# Patient Record
Sex: FEMALE
[De-identification: names, ages, dates, MRNs, and addresses within clinical notes are randomized per-mention and may not be internally consistent; named-entity substitution may affect disease eponyms.]

---

## 2023-09-18 ENCOUNTER — NURSE TRIAGE (OUTPATIENT)
Dept: OTHER | Facility: CLINIC | Age: 23
End: 2023-09-18

## 2023-09-18 NOTE — TELEPHONE ENCOUNTER
Location of patient: {[place]    Received call from [address] at [facility] with Red Flag Complaint. Emilie Vasquez MRN: [id number]     Provider: Merline Pratt NP    Subjective: Caller states \"When every I go to pee, my right side hurts. After I pee the pain goes away. When I push it feels more achy. \" Also feels achy with sex. Started the beginning of September. Current Symptoms:   Intermittent right lower abdomen pain  Pt denies urinary symptoms, vaginal discharge/odor, or pregnancy     Pain Severity: denies    Temperature: denies     What has been tried: increased fluid intake    Recommended disposition: See PCP within 24 Hours. Care advice provided, patient verbalizes understanding; denies any other questions or concerns. Outcome: See PCP within 24 hours. Spoke with Nae with [facility] and there are no available appt for today or tomorrow    No Appointments available, patient referred to [address] [place] [address] or urgent care      This triage is a result of a call to the [address]    Reason for Disposition   [1] MILD pain (e.g., does not interfere with normal activities) AND [2] pain comes and goes (cramps) AND [3] present > 48 hours  (Exception:  This same abdominal pain is a chronic symptom recurrent or ongoing AND present > 4 weeks.)    Protocols used: Abdominal Pain - Female-ADULT-